# Patient Record
Sex: FEMALE | Race: WHITE | NOT HISPANIC OR LATINO
[De-identification: names, ages, dates, MRNs, and addresses within clinical notes are randomized per-mention and may not be internally consistent; named-entity substitution may affect disease eponyms.]

---

## 2017-09-18 ENCOUNTER — HOSPITAL (OUTPATIENT)
Dept: OTHER | Age: 47
End: 2017-09-18
Attending: PHYSICIAN ASSISTANT

## 2018-10-22 ENCOUNTER — LAB SERVICES (OUTPATIENT)
Dept: OTHER | Age: 48
End: 2018-10-22

## 2018-10-22 ENCOUNTER — CHARTING TRANS (OUTPATIENT)
Dept: OTHER | Age: 48
End: 2018-10-22

## 2018-10-22 ENCOUNTER — IMAGING SERVICES (OUTPATIENT)
Dept: OTHER | Age: 48
End: 2018-10-22

## 2018-10-22 LAB
B-HCG SERPL-ACNC: <2 M[IU]/ML (ref 0–6)
BILIRUBIN URINE: NEGATIVE
BLOOD URINE: NEGATIVE
CLARITY: CLEAR
COLOR: YELLOW
GLUCOSE QUALITATIVE U: NEGATIVE
KETONES, URINE: NEGATIVE
LEUKOCYTE ESTERASE URINE: NEGATIVE
NITRITE URINE: NEGATIVE
PH URINE: 6.5 (ref 5–7)
PREGNANCY TEST, URINE: NEGATIVE
SPECIFIC GRAVITY URINE: <=1.005 (ref 1–1.03)
URINE PROTEIN, QUAL (DIPSTICK): NEGATIVE
UROBILINOGEN URINE: NORMAL

## 2018-10-23 LAB
25(OH)D3 SERPL-MCNC: ABNORMAL NG/ML
CLUE CELLS: PRESENT
TRICHOMONAS ANTIGEN: NEGATIVE
TRICHOMONAS: ABNORMAL

## 2018-10-26 ENCOUNTER — CHARTING TRANS (OUTPATIENT)
Dept: OTHER | Age: 48
End: 2018-10-26

## 2018-10-29 ENCOUNTER — CHARTING TRANS (OUTPATIENT)
Dept: OTHER | Age: 48
End: 2018-10-29

## 2018-10-29 LAB — AP REPORT: NORMAL

## 2018-11-05 ENCOUNTER — CHARTING TRANS (OUTPATIENT)
Dept: OTHER | Age: 48
End: 2018-11-05

## 2018-11-13 ENCOUNTER — CHARTING TRANS (OUTPATIENT)
Dept: OTHER | Age: 48
End: 2018-11-13

## 2018-11-15 ENCOUNTER — CHARTING TRANS (OUTPATIENT)
Dept: OTHER | Age: 48
End: 2018-11-15

## 2018-11-17 ENCOUNTER — LAB SERVICES (OUTPATIENT)
Dept: OTHER | Age: 48
End: 2018-11-17

## 2018-11-17 LAB
BASOPHIL %: 0.2 % (ref 0–1.2)
BASOPHIL ABSOLUTE #: 0 10*3/UL (ref 0–0.1)
DIFFERENTIAL TYPE: ABNORMAL
EOSINOPHIL %: 1.3 % (ref 0–10)
EOSINOPHIL ABSOLUTE #: 0.2 10*3/UL (ref 0–0.5)
HEMATOCRIT: 24 % (ref 34–45)
HEMOGLOBIN: 7.4 G/DL (ref 11.2–15.7)
LYMPH PERCENT: 16.7 % (ref 20.5–51.1)
LYMPHOCYTE ABSOLUTE #: 2 10*3/UL (ref 1.2–3.4)
MEAN CORPUSCULAR HGB CONCENTRATION: 30.8 % (ref 32–36)
MEAN CORPUSCULAR HGB: 21.9 PG (ref 27–34)
MEAN CORPUSCULAR VOLUME: 71 FL (ref 79–95)
MEAN PLATELET VOLUME: 10.4 FL (ref 8.6–12.4)
MONOCYTE ABSOLUTE #: 0.7 10*3/UL (ref 0.2–0.9)
MONOCYTE PERCENT: 6.2 % (ref 4.3–12.9)
NEUTROPHIL ABSOLUTE #: 9.1 10*3/UL (ref 1.4–6.5)
NEUTROPHIL PERCENT: 75.6 % (ref 34–73.5)
PLATELET COUNT: 380 10*3/UL (ref 150–400)
RED BLOOD CELL COUNT: 3.38 10*6/UL (ref 3.7–5.2)
RED CELL DISTRIBUTION WIDTH: 22 % (ref 11.3–14.8)
WHITE BLOOD CELL COUNT: 12 10*3/UL (ref 4–10)

## 2018-11-19 ENCOUNTER — CHARTING TRANS (OUTPATIENT)
Dept: OTHER | Age: 48
End: 2018-11-19

## 2018-11-20 ENCOUNTER — CHARTING TRANS (OUTPATIENT)
Dept: OTHER | Age: 48
End: 2018-11-20

## 2018-11-23 ENCOUNTER — CHARTING TRANS (OUTPATIENT)
Dept: OTHER | Age: 48
End: 2018-11-23

## 2018-11-23 ENCOUNTER — IMAGING SERVICES (OUTPATIENT)
Dept: OTHER | Age: 48
End: 2018-11-23

## 2018-11-26 ENCOUNTER — CHARTING TRANS (OUTPATIENT)
Dept: OTHER | Age: 48
End: 2018-11-26

## 2018-11-26 ENCOUNTER — LAB SERVICES (OUTPATIENT)
Dept: OTHER | Age: 48
End: 2018-11-26

## 2018-11-26 ENCOUNTER — IMAGING SERVICES (OUTPATIENT)
Dept: OTHER | Age: 48
End: 2018-11-26

## 2018-11-26 LAB — PREGNANCY TEST, URINE: NEGATIVE

## 2018-11-28 ENCOUNTER — CHARTING TRANS (OUTPATIENT)
Dept: OTHER | Age: 48
End: 2018-11-28

## 2018-11-28 LAB — AP REPORT: NORMAL

## 2018-11-29 ENCOUNTER — CHARTING TRANS (OUTPATIENT)
Dept: OTHER | Age: 48
End: 2018-11-29

## 2018-11-30 ENCOUNTER — CHARTING TRANS (OUTPATIENT)
Dept: OTHER | Age: 48
End: 2018-11-30

## 2018-12-04 ENCOUNTER — TELEPHONE (OUTPATIENT)
Dept: OBGYN | Age: 48
End: 2018-12-04

## 2018-12-04 ENCOUNTER — APPOINTMENT (OUTPATIENT)
Dept: OBGYN | Age: 48
End: 2018-12-04

## 2018-12-04 VITALS — DIASTOLIC BLOOD PRESSURE: 88 MMHG | SYSTOLIC BLOOD PRESSURE: 120 MMHG | WEIGHT: 155 LBS | BODY MASS INDEX: 26.61 KG/M2

## 2018-12-04 VITALS
SYSTOLIC BLOOD PRESSURE: 116 MMHG | DIASTOLIC BLOOD PRESSURE: 70 MMHG | HEIGHT: 63 IN | WEIGHT: 154 LBS | BODY MASS INDEX: 27.29 KG/M2

## 2018-12-06 ENCOUNTER — TELEPHONE (OUTPATIENT)
Dept: OBGYN | Age: 48
End: 2018-12-06

## 2018-12-06 RX ORDER — PROCHLORPERAZINE 25 MG
25 SUPPOSITORY, RECTAL RECTAL EVERY 12 HOURS PRN
Qty: 3 SUPPOSITORY | Refills: 0 | Status: SHIPPED | OUTPATIENT
Start: 2018-12-06 | End: 2018-12-07 | Stop reason: SDUPTHER

## 2018-12-06 RX ORDER — DIAZEPAM 10 MG/1
TABLET ORAL
Qty: 3 TABLET | Refills: 0 | Status: SHIPPED | OUTPATIENT
Start: 2018-12-06

## 2018-12-06 RX ORDER — HYDROCODONE BITARTRATE AND ACETAMINOPHEN 7.5; 325 MG/1; MG/1
TABLET ORAL
Qty: 20 TABLET | Refills: 0 | Status: SHIPPED | OUTPATIENT
Start: 2018-12-06

## 2018-12-06 RX ORDER — IBUPROFEN 800 MG/1
TABLET ORAL
Qty: 6 TABLET | Refills: 0 | Status: SHIPPED | OUTPATIENT
Start: 2018-12-06

## 2018-12-07 RX ORDER — PROCHLORPERAZINE 25 MG
25 SUPPOSITORY, RECTAL RECTAL EVERY 12 HOURS PRN
Qty: 3 SUPPOSITORY | Refills: 0 | Status: SHIPPED | OUTPATIENT
Start: 2018-12-07

## 2018-12-10 ENCOUNTER — TELEPHONE (OUTPATIENT)
Dept: OBGYN | Age: 48
End: 2018-12-10

## 2018-12-27 ENCOUNTER — TELEPHONE (OUTPATIENT)
Dept: OBGYN | Age: 48
End: 2018-12-27

## 2018-12-28 RX ORDER — MEDROXYPROGESTERONE ACETATE 10 MG/1
10 TABLET ORAL DAILY
Qty: 90 TABLET | Refills: 0 | Status: SHIPPED | OUTPATIENT
Start: 2018-12-28

## 2019-01-24 ENCOUNTER — IMAGING SERVICES (OUTPATIENT)
Dept: OTHER | Age: 49
End: 2019-01-24

## 2019-03-05 VITALS
WEIGHT: 154 LBS | SYSTOLIC BLOOD PRESSURE: 142 MMHG | BODY MASS INDEX: 26.29 KG/M2 | HEIGHT: 64 IN | DIASTOLIC BLOOD PRESSURE: 92 MMHG

## 2019-03-05 VITALS
WEIGHT: 157 LBS | DIASTOLIC BLOOD PRESSURE: 78 MMHG | HEIGHT: 63 IN | SYSTOLIC BLOOD PRESSURE: 128 MMHG | BODY MASS INDEX: 27.82 KG/M2

## 2021-03-03 ENCOUNTER — TELEPHONE (OUTPATIENT)
Dept: FAMILY MEDICINE CLINIC | Facility: CLINIC | Age: 51
End: 2021-03-03

## 2021-03-10 ENCOUNTER — HOSPITAL ENCOUNTER (OUTPATIENT)
Dept: GENERAL RADIOLOGY | Age: 51
Discharge: HOME | End: 2021-03-10
Attending: FAMILY MEDICINE
Payer: COMMERCIAL

## 2021-03-10 ENCOUNTER — OFFICE VISIT (OUTPATIENT)
Dept: FAMILY MEDICINE CLINIC | Facility: CLINIC | Age: 51
End: 2021-03-10
Payer: COMMERCIAL

## 2021-03-10 VITALS
WEIGHT: 154.38 LBS | BODY MASS INDEX: 26.36 KG/M2 | OXYGEN SATURATION: 95 % | DIASTOLIC BLOOD PRESSURE: 82 MMHG | TEMPERATURE: 97 F | HEART RATE: 73 BPM | RESPIRATION RATE: 16 BRPM | SYSTOLIC BLOOD PRESSURE: 118 MMHG | HEIGHT: 64 IN
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
## 2021-03-10 DIAGNOSIS — M54.50 ACUTE MIDLINE LOW BACK PAIN WITHOUT SCIATICA: ICD-10-CM

## 2021-03-10 DIAGNOSIS — M54.2 NECK PAIN: ICD-10-CM
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
## 2021-03-10 DIAGNOSIS — M25.511 ACUTE PAIN OF RIGHT SHOULDER: ICD-10-CM

## 2021-03-10 DIAGNOSIS — R07.81 RIB PAIN ON LEFT SIDE: ICD-10-CM

## 2021-03-10 PROBLEM — I10 ESSENTIAL HYPERTENSION: Status: ACTIVE | Noted: 2021-03-10

## 2021-03-10 PROBLEM — E78.00 HYPERCHOLESTEROLEMIA: Status: ACTIVE | Noted: 2021-03-10

## 2021-03-10 PROBLEM — I25.2 HX OF MYOCARDIAL INFARCTION: Status: ACTIVE | Noted: 2021-03-10

## 2021-03-10 PROCEDURE — 3079F DIAST BP 80-89 MM HG: CPT | Performed by: FAMILY MEDICINE

## 2021-03-10 PROCEDURE — 99204 OFFICE O/P NEW MOD 45 MIN: CPT | Performed by: FAMILY MEDICINE

## 2021-03-10 PROCEDURE — 3074F SYST BP LT 130 MM HG: CPT | Performed by: FAMILY MEDICINE

## 2021-03-10 PROCEDURE — 3008F BODY MASS INDEX DOCD: CPT | Performed by: FAMILY MEDICINE

## 2021-03-10 RX ORDER — ATORVASTATIN CALCIUM 40 MG/1
TABLET, FILM COATED ORAL
COMMUNITY
Start: 2021-02-24

## 2021-03-10 RX ORDER — CYCLOBENZAPRINE HCL 5 MG
5 TABLET ORAL NIGHTLY PRN
Qty: 15 TABLET | Refills: 0 | Status: SHIPPED | OUTPATIENT
Start: 2021-03-10

## 2021-03-10 RX ORDER — METOPROLOL TARTRATE 50 MG/1
50 TABLET, FILM COATED ORAL 2 TIMES DAILY WITH MEALS
COMMUNITY
Start: 2021-03-07

## 2021-03-10 RX ORDER — CLOPIDOGREL BISULFATE 75 MG/1
TABLET ORAL
COMMUNITY
Start: 2021-03-07

## 2021-03-10 NOTE — PATIENT INSTRUCTIONS
Check xray today. Start physical therapy. Advice rest, heating pad, tylenol as needed. Take cyclobenzaprine as needed. Medication will make you drowsy, so no driving after taking medication. Recheck with me in 1-2 weeks.      Follow up with your PC
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
## 2021-03-12 ENCOUNTER — TELEPHONE (OUTPATIENT)
Dept: FAMILY MEDICINE CLINIC | Facility: CLINIC | Age: 51
End: 2021-03-12
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
## 2021-03-30 ENCOUNTER — HOSPITAL ENCOUNTER (OUTPATIENT)
Dept: GENERAL RADIOLOGY | Age: 51
Discharge: HOME | End: 2021-03-30
Attending: FAMILY MEDICINE

## 2021-03-30 ENCOUNTER — OFFICE VISIT (OUTPATIENT)
Dept: FAMILY MEDICINE CLINIC | Facility: CLINIC | Age: 51
End: 2021-03-30
Payer: COMMERCIAL

## 2021-03-30 ENCOUNTER — TELEPHONE (OUTPATIENT)
Dept: FAMILY MEDICINE CLINIC | Facility: CLINIC | Age: 51
End: 2021-03-30

## 2021-03-30 ENCOUNTER — HOSPITAL ENCOUNTER (OUTPATIENT)
Dept: GENERAL RADIOLOGY | Age: 51
End: 2021-03-30
Attending: FAMILY MEDICINE

## 2021-03-30 VITALS
BODY MASS INDEX: 25.78 KG/M2 | WEIGHT: 151 LBS | TEMPERATURE: 98 F | HEART RATE: 98 BPM | HEIGHT: 64 IN | DIASTOLIC BLOOD PRESSURE: 80 MMHG | OXYGEN SATURATION: 98 % | RESPIRATION RATE: 18 BRPM | SYSTOLIC BLOOD PRESSURE: 130 MMHG

## 2021-03-30 DIAGNOSIS — N20.0 KIDNEY STONE: ICD-10-CM
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
## 2021-03-30 DIAGNOSIS — M25.511 ACUTE PAIN OF RIGHT SHOULDER: ICD-10-CM

## 2021-03-30 DIAGNOSIS — M54.50 ACUTE MIDLINE LOW BACK PAIN WITHOUT SCIATICA: ICD-10-CM

## 2021-03-30 DIAGNOSIS — M54.2 NECK PAIN: ICD-10-CM

## 2021-03-30 DIAGNOSIS — R91.1 PULMONARY NODULE: ICD-10-CM

## 2021-03-30 PROCEDURE — 72110 X-RAY EXAM L-2 SPINE 4/>VWS: CPT | Performed by: FAMILY MEDICINE

## 2021-03-30 PROCEDURE — 73030 X-RAY EXAM OF SHOULDER: CPT | Performed by: FAMILY MEDICINE

## 2021-03-30 PROCEDURE — 99213 OFFICE O/P EST LOW 20 MIN: CPT | Performed by: FAMILY MEDICINE

## 2021-03-30 PROCEDURE — 71100 X-RAY EXAM RIBS UNI 2 VIEWS: CPT | Performed by: FAMILY MEDICINE

## 2021-03-30 PROCEDURE — 3075F SYST BP GE 130 - 139MM HG: CPT | Performed by: FAMILY MEDICINE

## 2021-03-30 PROCEDURE — 3079F DIAST BP 80-89 MM HG: CPT | Performed by: FAMILY MEDICINE

## 2021-03-30 PROCEDURE — 3008F BODY MASS INDEX DOCD: CPT | Performed by: FAMILY MEDICINE

## 2021-03-30 RX ORDER — DIAZEPAM 5 MG/1
5 TABLET ORAL EVERY 8 HOURS
COMMUNITY
Start: 2021-03-19

## 2021-03-30 RX ORDER — OXYCODONE HYDROCHLORIDE 30 MG/1
30 TABLET ORAL EVERY 8 HOURS PRN
COMMUNITY
Start: 2021-03-19

## 2021-03-30 RX ORDER — SERTRALINE HYDROCHLORIDE 100 MG/1
100 TABLET, FILM COATED ORAL DAILY
COMMUNITY
Start: 2020-12-31

## 2021-03-30 RX ORDER — VARENICLINE TARTRATE 0.5 MG/1
TABLET, FILM COATED ORAL
COMMUNITY
Start: 2021-03-23
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
## 2021-03-30 NOTE — PATIENT INSTRUCTIONS
Continue current medications  Continue with pain medication as needed   Xray done today. See shoulder specialist.   Start physical therapy. Follow up with you pcp regarding kidney stone and pulmonary nodule.      Return to clinic if pain worse or any

## 2021-03-30 NOTE — TELEPHONE ENCOUNTER
----- Message from Thomas Yates MD sent at 3/30/2021  3:04 PM CDT -----  Please inform patient that radiologist report does not show any fracture or dislocation of right shoulder, spine or rib cage. Recommend to schedule appointment with shoulder specialis

## 2021-09-24 ENCOUNTER — TELEPHONE (OUTPATIENT)
Dept: FAMILY MEDICINE CLINIC | Facility: CLINIC | Age: 51
End: 2021-09-24

## 2021-09-24 NOTE — TELEPHONE ENCOUNTER
Medical records request was sent from [facility]     Phone # [phone]  Fax # [phone]

## 2021-09-29 ENCOUNTER — TELEPHONE (OUTPATIENT)
Dept: FAMILY MEDICINE CLINIC | Facility: CLINIC | Age: 51
End: 2021-09-29
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
## 2023-04-27 PROCEDURE — 93010 ELECTROCARDIOGRAM REPORT: CPT | Performed by: INTERNAL MEDICINE

## 2023-05-07 ENCOUNTER — EXTERNAL RECORD (OUTPATIENT)
Dept: OTHER | Age: 53
End: 2023-05-07

## 2023-05-07 PROCEDURE — 93010 ELECTROCARDIOGRAM REPORT: CPT | Performed by: INTERNAL MEDICINE

## 2023-05-08 ENCOUNTER — EXTERNAL RECORD (OUTPATIENT)
Dept: PULMONOLOGY | Age: 53
End: 2023-05-08

## 2023-05-08 PROCEDURE — 99223 1ST HOSP IP/OBS HIGH 75: CPT | Performed by: INTERNAL MEDICINE

## 2023-05-08 PROCEDURE — 93306 TTE W/DOPPLER COMPLETE: CPT | Performed by: INTERNAL MEDICINE

## 2023-05-09 ENCOUNTER — EXTERNAL RECORD (OUTPATIENT)
Dept: OTHER | Age: 53
End: 2023-05-09

## 2023-05-09 LAB
A/G RATIO: 1.18 (ref 1.1–2.4)
ALANINE AMINOTRANSFE: 8 U/L (ref 7–52)
ALBUMIN, SERUM (ALB): 3.3 G/DL (ref 3.5–5.7)
ALKALINE PHOSPHATASE (ALK): 51 U/L (ref 34–104)
ANION GAP: 6 MEQ/L (ref 6.2–14.7)
ASPARTATE AMINOTRANS: 15 U/L (ref 13–39)
BILIRUBIN, TOTAL: 0.4 MG/DL (ref 0.2–0.8)
BLOOD UREA NITROGEN (BUN): 11 MG/DL (ref 7–25)
BUN/CREATININE RATIO: 17.5 (ref 7.4–23)
CALCIUM ALBUMIN ADJUSTED: 9.1 MG/DL (ref 8.6–10.3)
CALCIUM, SERUM: 8.5 MG/DL (ref 8.6–10.3)
CARBON DIOXIDE: 29 MEQ/L (ref 21–31)
CHLORIDE, SERUM: 105 MMOL/L (ref 98–107)
CREATININE: 0.63 MG/DL (ref 0.6–1.2)
EST GLOMERULAR FILTRATION RATE: > 60 ML/MIN
GLUCOSE: 80 MG/DL (ref 70–99)
K (POTASSIUM, SERUM): 3.8 MMOL/L (ref 3.5–5.2)
MG (MAGNESIUM, SERUM: 1.6 MG/DL (ref 1.6–2.6)
NA (SODIUM, SERUM): 140 MMOL/L (ref 133–144)
PROTEIN TOTAL: 6.1 G/DL (ref 6.4–8.9)

## 2023-05-09 PROCEDURE — 99233 SBSQ HOSP IP/OBS HIGH 50: CPT | Performed by: INTERNAL MEDICINE

## 2023-08-28 PROCEDURE — 93306 TTE W/DOPPLER COMPLETE: CPT | Performed by: INTERNAL MEDICINE
